# Patient Record
Sex: MALE | Race: WHITE | URBAN - METROPOLITAN AREA
[De-identification: names, ages, dates, MRNs, and addresses within clinical notes are randomized per-mention and may not be internally consistent; named-entity substitution may affect disease eponyms.]

---

## 2017-02-11 ENCOUNTER — HOSPITAL ENCOUNTER (EMERGENCY)
Facility: CLINIC | Age: 32
Discharge: HOME | End: 2017-02-11
Attending: FAMILY MEDICINE | Admitting: FAMILY MEDICINE
Payer: COMMERCIAL

## 2017-02-11 VITALS
RESPIRATION RATE: 16 BRPM | OXYGEN SATURATION: 99 % | DIASTOLIC BLOOD PRESSURE: 78 MMHG | TEMPERATURE: 98 F | SYSTOLIC BLOOD PRESSURE: 113 MMHG | BODY MASS INDEX: 31.83 KG/M2 | WEIGHT: 210 LBS | HEIGHT: 68 IN

## 2017-02-11 DIAGNOSIS — B30.9 VIRAL CONJUNCTIVITIS OF RIGHT EYE: ICD-10-CM

## 2017-02-11 DIAGNOSIS — B97.89 VIRAL RESPIRATORY INFECTION: ICD-10-CM

## 2017-02-11 DIAGNOSIS — J98.8 VIRAL RESPIRATORY INFECTION: ICD-10-CM

## 2017-02-11 PROCEDURE — 99283 EMERGENCY DEPT VISIT LOW MDM: CPT | Performed by: FAMILY MEDICINE

## 2017-02-11 PROCEDURE — 99283 EMERGENCY DEPT VISIT LOW MDM: CPT

## 2017-02-11 ASSESSMENT — VISUAL ACUITY
OS: 20/20
OD: 20/20

## 2017-02-11 NOTE — ED AVS SNAPSHOT
[facility] Emergency Department    [address] [facility] [address]    Phone:  [phone]    Fax:  [phone]                                       Pancho Ray   MRN: [id number]    Department:  [facility] Emergency Department   Date of Visit:  2/11/2017           After Visit Summary Signature Page     I have received my discharge instructions, and my questions have been answered. I have discussed any challenges I see with this plan with the nurse or doctor.    ..........................................................................................................................................  Patient/Patient Representative Signature      ..........................................................................................................................................  Patient Representative Print Name and Relationship to Patient    ..................................................               ................................................  Date                                            Time    ..........................................................................................................................................  Reviewed by Signature/Title    ...................................................              ..............................................  Date                                                            Time

## 2017-02-11 NOTE — DISCHARGE INSTRUCTIONS
Conjunctivitis, Viral    Viral conjunctivitis (sometimes called pink eye) is a common infection of the eye. It is very contagious. Touching the infected eye, then touching another person passes this infection. It can also be spread from one eye to the other in this same way. The most common symptoms include redness, discharge from the eye, swollen eyelids, and a gritty or scratchy feeling in the eye.  This condition will take about 7 to 10 days to go away. Artificial tears (available without a prescription) are often recommended to moisten and clean the eyes. Antibiotic eye drops often are not recommended because they will not kill the virus. But sometimes they may be prescribed by eye doctors. This is to prevent a second, bacterial infection.  Home care    Apply a towel soaked in cool water to the affected eye 3 to 4 times a day (just before applying medicine to the eye).    It is common to have mucus drainage during the night, causing the eyelids to become crusted by morning. Use a warm, wet cloth to wipe this away.    Launder cloths used to clean the eye after one use. Do not reuse them.    If antibiotic medicines are prescribed, take them exactly as directed. Do not stop taking them until you are told to.    You may use acetaminophen or ibuprofen to control pain, unless another medicine was prescribed. (Note:If you have chronic liver or kidney disease, or if you have ever had a stomach ulcer or gastrointestinal bleeding, talk with your healthcare provider before using these medicines.) Aspirin should never be used in anyone under 18 years of age who is ill with a fever. It may cause severe liver damage.    Wash your hands before and after touching the affected eye. This helps to prevent spreading the infection to your other eye and to others.    The infected person should avoid sharing towels, washcloths, and bedding with others. This is to prevent spreading the infection.    This illness is contagious during  the first week. Children with this illness should be kept out of day care and school until the redness clears.  Follow-up care  Follow up with your healthcare provider, or as advised.  When to seek medical advice  Call your healthcare provider right away if any of these occur:    Worsening vision    Increasing pain in the eye    Increasing swelling or redness of the eyelid    Redness spreading to the face around the eye    Large amount of green or yellow drainage from the eye    Severe itching in or around the eye    Fever of 100.4 F (38 C) or higher    5168-4899 [employer]. [address] [place] [address]. All rights reserved. This information is not intended as a substitute for professional medical care. Always follow your healthcare professional's instructions.

## 2017-02-11 NOTE — ED AVS SNAPSHOT
[facility] Emergency Department    [address] [facility] [address]    Phone:  [phone]    Fax:  [phone]                                       Pancho Ray   MRN: [id number]    Department:  [facility] Emergency Department   Date of Visit:  2/11/2017           Patient Information     Date Of Birth          1985        Your diagnoses for this visit were:     Viral respiratory infection     Viral conjunctivitis of right eye        You were seen by Brian Doyle MD.        Discharge Instructions         Conjunctivitis, Viral    Viral conjunctivitis (sometimes called pink eye) is a common infection of the eye. It is very contagious. Touching the infected eye, then touching another person passes this infection. It can also be spread from one eye to the other in this same way. The most common symptoms include redness, discharge from the eye, swollen eyelids, and a gritty or scratchy feeling in the eye.  This condition will take about 7 to 10 days to go away. Artificial tears (available without a prescription) are often recommended to moisten and clean the eyes. Antibiotic eye drops often are not recommended because they will not kill the virus. But sometimes they may be prescribed by eye doctors. This is to prevent a second, bacterial infection.  Home care    Apply a towel soaked in cool water to the affected eye 3 to 4 times a day (just before applying medicine to the eye).    It is common to have mucus drainage during the night, causing the eyelids to become crusted by morning. Use a warm, wet cloth to wipe this away.    Launder cloths used to clean the eye after one use. Do not reuse them.    If antibiotic medicines are prescribed, take them exactly as directed. Do not stop taking them until you are told to.    You may use acetaminophen or ibuprofen to control pain, unless another medicine was prescribed. (Note:If you have chronic liver or kidney disease, or if you have ever had a  stomach ulcer or gastrointestinal bleeding, talk with your healthcare provider before using these medicines.) Aspirin should never be used in anyone under 18 years of age who is ill with a fever. It may cause severe liver damage.    Wash your hands before and after touching the affected eye. This helps to prevent spreading the infection to your other eye and to others.    The infected person should avoid sharing towels, washcloths, and bedding with others. This is to prevent spreading the infection.    This illness is contagious during the first week. Children with this illness should be kept out of day care and school until the redness clears.  Follow-up care  Follow up with your healthcare provider, or as advised.  When to seek medical advice  Call your healthcare provider right away if any of these occur:    Worsening vision    Increasing pain in the eye    Increasing swelling or redness of the eyelid    Redness spreading to the face around the eye    Large amount of green or yellow drainage from the eye    Severe itching in or around the eye    Fever of 100.4 F (38 C) or higher    8861-1143 [employer]. [address] [place] [address]. All rights reserved. This information is not intended as a substitute for professional medical care. Always follow your healthcare professional's instructions.           Discharge References/Attachments     URI, VIRAL, NO ABX (ADULT) (ENGLISH)      24 Hour Appointment Hotline       To make an appointment at any [facility], call [phone] ([phone]). If you don't have a family doctor or clinic, we will help you find one. [facility] clinics are conveniently located to serve the needs of you and your family.             Review of your medicines      Notice     You have not been prescribed any medications.            Orders Needing Specimen Collection     None      Pending Results     No orders found from 2/10/2017 to 2/12/2017.            Pending  "Culture Results     No orders found from 2/10/2017 to 2017.             Test Results from your hospital stay            Thank you for choosing [facility]       Thank you for choosing [facility] for your care. Our goal is always to provide you with excellent care. Hearing back from our patients is one way we can continue to improve our services. Please take a few minutes to complete the written survey that you may receive in the mail after you visit with us. Thank you!        [email or web address] Information     [employer] lets you send messages to your doctor, view your test results, renew your prescriptions, schedule appointments and more. To sign up, go to [email or web address] . Click on \"Log in\" on the left side of the screen, which will take you to the Welcome page. Then click on \"Sign up Now\" on the right side of the page.     You will be asked to enter the access code listed below, as well as some personal information. Please follow the directions to create your username and password.     Your access code is: [email or web address]  Expires: 2017  8:10 AM     Your access code will  in 90 days. If you need help or a new code, please call your [facility] clinic or [phone].        Care EveryWhere ID     This is your Care EveryWhere ID. This could be used by other organizations to access your [facility] medical records  [id number]        After Visit Summary       This is your record. Keep this with you and show to your community pharmacist(s) and doctor(s) at your next visit.                  "

## 2017-02-11 NOTE — ED PROVIDER NOTES
"  History     Chief Complaint   Patient presents with     Conjunctivitis     pink sclera yesterday, today woke with mattery eye, URI sx     HPI    Mr. Pancho Ray is a 31 year old male who presents to the ED today for evaluation of conjunctivitis. The patient reports right pink conjunctiva beginning yesterday worsening today with crusting and drainage around his eye this morning. Additionally, the patient endorses associated rhinorhea and congestion since the start of his right eye symptoms.  He has a mild cough The patient believes his symptoms are representative of \"pink eye\".  He denies shortness of breath, or fever. He denies history of eye disease, allergies to medications, or daily medications.     I have reviewed the Medications, Allergies, Past Medical and Surgical History, and Social History in the [id number] system.  Past Medical History   No past medical history on file.    Problem List  There is no problem list on file for this patient.      Past Surgical History  No past surgical history on file.    Social History  Social History     Social History     Marital Status:      Spouse Name: N/A     Number of Children: N/A     Years of Education: N/A     Occupational History     Not on file.     Social History Main Topics     Smoking status: Not on file     Smokeless tobacco: Not on file     Alcohol Use: Not on file     Drug Use: Not on file     Sexual Activity: Not on file     Other Topics Concern     Not on file     Social History Narrative     No narrative on file       Review of Systems  Further problem focused review negative.  Physical Exam   BP: 121/82 mmHg  Heart Rate: 73  Temp: 98  F (36.7  C)  Resp: 16  Height: 172.7 cm (5' 8\")  Weight: 95.255 kg (210 lb)  SpO2: 99 %  Physical Exam  Nursing note and vitals were reviewed.  Constitutional: Healthy-appearing 31-year-old with hyponasal voice does not appear significantly ill or uncomfortable  HEENT: EACs clear.  TMs normal.  Slit-lamp examination " reveals conjunctival injection in the right eye without ciliary blush.  Lids and lashes are intact.  Cornea is intact.  Anterior chamber is clear.  Cardiovascular: Cardiac examination reveals normal heart rate and regular rhythm without murmur.  Pulmonary/Chest: Breathing is unlabored.  Breath sounds are clear and equal bilaterally.  There no retractions, tachypnea, rales, wheezes, rhonchi.      ED Course   Procedures             Critical Care time:  none               Labs Ordered and Resulted from Time of ED Arrival Up to the Time of Departure from the ED - No data to display  No results found for this or any previous visit (from the past 24 hour(s)).    Medications - No data to display    0804 AM Patient Assessed. Course of care outlined  Assessments & Plan (with Medical Decision Making)     31-year-old presents with congestion, rhinorrhea, conjunctivitis, mild cough.  Symptoms consistent with viral respiratory infection including viral conjunctivitis.  The nature of this was reviewed with him and signs and symptoms that should prompt follow-up were also discussed.    I have reviewed the nursing notes.    I have reviewed the findings, diagnosis, plan and need for follow up with the patient.    There are no discharge medications for this patient.      Final diagnoses:   Viral respiratory infection   Viral conjunctivitis of right eye     This document serves as a record of the services and decisions personally performed and made by Brian Doyle MD. It was created on HIS/HER behalf by Scott Osei, a trained medical scribe. The creation of this document is based the provider's statements to the medical scribe.  Scott Osei 8:03 AM 2/11/2017    Provider:   The information in this document, created by the medical scribe for me, accurately reflects the services I personally performed and the decisions made by me. I have reviewed and approved this document for accuracy prior to leaving the patient care  area.  Brian Doyle MD 8:03 AM 2/11/2017 2/11/2017   [facility] EMERGENCY DEPARTMENT      Brian Doyle MD  02/11/17 0854

## 2017-07-28 ENCOUNTER — HOSPITAL ENCOUNTER (EMERGENCY)
Facility: CLINIC | Age: 32
Discharge: HOME | End: 2017-07-28
Attending: NURSE PRACTITIONER | Admitting: NURSE PRACTITIONER

## 2017-07-28 VITALS
RESPIRATION RATE: 18 BRPM | TEMPERATURE: 98.2 F | OXYGEN SATURATION: 97 % | HEIGHT: 68 IN | SYSTOLIC BLOOD PRESSURE: 125 MMHG | BODY MASS INDEX: 32.58 KG/M2 | WEIGHT: 215 LBS | DIASTOLIC BLOOD PRESSURE: 83 MMHG | HEART RATE: 82 BPM

## 2017-07-28 DIAGNOSIS — L02.439 CARBUNCLE OF AXILLA: ICD-10-CM

## 2017-07-28 DIAGNOSIS — L03.111 CELLULITIS OF RIGHT AXILLA: ICD-10-CM

## 2017-07-28 PROCEDURE — 87186 SC STD MICRODIL/AGAR DIL: CPT | Performed by: NURSE PRACTITIONER

## 2017-07-28 PROCEDURE — 87070 CULTURE OTHR SPECIMN AEROBIC: CPT | Performed by: NURSE PRACTITIONER

## 2017-07-28 PROCEDURE — 87077 CULTURE AEROBIC IDENTIFY: CPT | Performed by: NURSE PRACTITIONER

## 2017-07-28 PROCEDURE — 10060 I&D ABSCESS SIMPLE/SINGLE: CPT

## 2017-07-28 PROCEDURE — 99213 OFFICE O/P EST LOW 20 MIN: CPT | Mod: 25

## 2017-07-28 PROCEDURE — 99213 OFFICE O/P EST LOW 20 MIN: CPT | Mod: 25 | Performed by: NURSE PRACTITIONER

## 2017-07-28 PROCEDURE — 10060 I&D ABSCESS SIMPLE/SINGLE: CPT | Performed by: NURSE PRACTITIONER

## 2017-07-28 RX ORDER — CEPHALEXIN 500 MG/1
500 CAPSULE ORAL 4 TIMES DAILY
Qty: 40 CAPSULE | Refills: 0 | Status: SHIPPED | OUTPATIENT
Start: 2017-07-28 | End: 2017-08-07

## 2017-07-28 NOTE — DISCHARGE INSTRUCTIONS
Discharge Instructions for Cellulitis  You have been diagnosed with cellulitis. This is an infection in the deepest layer of the skin. In some cases, the infection also affects the muscle. Cellulitis is caused by bacteria. The bacteria can enter the body through broken skin. This can happen with a cut, scratch, animal bite, or an insect bite that has been scratched. You may have been treated in the hospital with antibiotics and fluids. You will likely be given a prescription for antibiotics to take at home. This sheet will help you take care of yourself at home.  Home care  When you are home:    Take the prescribed antibiotic medicine you are given as directed until it is gone. Take it even if you feel better. It treats the infection and stops it from returning. Not taking all the medicine can make future infections hard to treat.    Keep the infected area clean.    When possible, raise the infected area above the level of your heart. This helps keep swelling down.    Talk with your healthcare provider if you are in pain. Ask what kind of over-the-counter medicine you can take for pain.    Apply clean bandages as advised.    Take your temperature once a day for a week.    Wash your hands often to prevent spreading the infection.  In the future, wash your hands before and after you touch cuts, scratches, or bandages. This will help prevent infection.   When to call your healthcare provider  Call your healthcare provider immediately if you have any of the following:    Difficulty or pain when moving the joints above or below the infected area    Discharge or pus draining from the area    Fever of 100.4 F (38 C) or higher, or as directed by your healthcare provider    Pain that gets worse in or around the infected     Redness that gets worse in or around the infected area, particularly if the area of redness expands to a wider area    Shaking chills    Swelling of the infected area    Vomiting   Date Last Reviewed:  8/1/2016 2000-2017 [employer]. [address] [place] [address], [place] [address]. All rights reserved. This information is not intended as a substitute for professional medical care. Always follow your healthcare professional's instructions.

## 2017-07-28 NOTE — ED AVS SNAPSHOT
[facility] Emergency Department    [address] [facility] [address]    Phone:  [phone]    Fax:  [phone]                                       Pancho Ray   MRN: [id number]    Department:  [facility] Emergency Department   Date of Visit:  7/28/2017           Patient Information     Date Of Birth          1985        Your diagnoses for this visit were:     Carbuncle of axilla     Cellulitis of right axilla        You were seen by Zita Davis APRN CNP.      Follow-up Information     Follow up with [facility] Emergency Department.    Specialty:  EMERGENCY MEDICINE    Why:  If symptoms worsen    Contact information:    [address] [facility] [address] [phone]    Additional information:    The medical center is located at   [address] (between [address] in [place], four miles north   of [place]).        Discharge Instructions         Discharge Instructions for Cellulitis  You have been diagnosed with cellulitis. This is an infection in the deepest layer of the skin. In some cases, the infection also affects the muscle. Cellulitis is caused by bacteria. The bacteria can enter the body through broken skin. This can happen with a cut, scratch, animal bite, or an insect bite that has been scratched. You may have been treated in the hospital with antibiotics and fluids. You will likely be given a prescription for antibiotics to take at home. This sheet will help you take care of yourself at home.  Home care  When you are home:    Take the prescribed antibiotic medicine you are given as directed until it is gone. Take it even if you feel better. It treats the infection and stops it from returning. Not taking all the medicine can make future infections hard to treat.    Keep the infected area clean.    When possible, raise the infected area above the level of your heart. This helps keep swelling down.    Talk with your  healthcare provider if you are in pain. Ask what kind of over-the-counter medicine you can take for pain.    Apply clean bandages as advised.    Take your temperature once a day for a week.    Wash your hands often to prevent spreading the infection.  In the future, wash your hands before and after you touch cuts, scratches, or bandages. This will help prevent infection.   When to call your healthcare provider  Call your healthcare provider immediately if you have any of the following:    Difficulty or pain when moving the joints above or below the infected area    Discharge or pus draining from the area    Fever of 100.4 F (38 C) or higher, or as directed by your healthcare provider    Pain that gets worse in or around the infected     Redness that gets worse in or around the infected area, particularly if the area of redness expands to a wider area    Shaking chills    Swelling of the infected area    Vomiting   Date Last Reviewed: 8/1/2016 2000-2017 [employer]. [address] [place] [address]. All rights reserved. This information is not intended as a substitute for professional medical care. Always follow your healthcare professional's instructions.          24 Hour Appointment Hotline       To make an appointment at any [facility]], call [phone] ([phone]). If you don't have a family doctor or clinic, we will help you find one. [facility] clinics are conveniently located to serve the needs of you and your family.             Review of your medicines      START taking        Dose / Directions Last dose taken    cephALEXin 500 MG capsule   Commonly known as:  KEFLEX   Dose:  500 mg   Quantity:  40 capsule        Take 1 capsule (500 mg) by mouth 4 times daily for 10 days   Refills:  0                Prescriptions were sent or printed at these locations (1 Prescription)                   [id number] - [place], [place] - [address]   [address] [facility]  "[place] [address]    Telephone:  [phone]   Fax:  [phone]   Hours:                  E-Prescribed (1 of 1)         cephALEXin (KEFLEX) 500 MG capsule                Procedures and tests performed during your visit     Wound Culture Aerobic Bacterial      Orders Needing Specimen Collection     None      Pending Results     No orders found from 2017 to 2017.            Pending Culture Results     No orders found from 2017 to 2017.            Pending Results Instructions     If you had any lab results that were not finalized at the time of your Discharge, you can call the ED Lab Result RN at [phone]. You will be contacted by this team for any positive Lab results or changes in treatment. The nurses are available 7 days a week from 10A to 6:30P.  You can leave a message 24 hours per day and they will return your call.        Test Results From Your Hospital Stay               Thank you for choosing [facility]       Thank you for choosing [facility] for your care. Our goal is always to provide you with excellent care. Hearing back from our patients is one way we can continue to improve our services. Please take a few minutes to complete the written survey that you may receive in the mail after you visit with us. Thank you!        [employer] Information     [employer] lets you send messages to your doctor, view your test results, renew your prescriptions, schedule appointments and more. To sign up, go to [email or web address] . Click on \"Log in\" on the left side of the screen, which will take you to the Welcome page. Then click on \"Sign up Now\" on the right side of the page.     You will be asked to enter the access code listed below, as well as some personal information. Please follow the directions to create your username and password.     Your access code is: [email or web address]  Expires: 10/26/2017  6:51 PM     Your access code will  in 90 days. If you need help or a new code, please call your " [facility] or [phone].        Care EveryWhere ID     This is your Care EveryWhere ID. This could be used by other organizations to access your [facility] medical records  [id number]        Equal Access to Services     CASI HOPSON : Bhargav Maurer, waaxda luqadaha, qaybta kaalmada zac, javon gill. So [facility] [phone].    ATENCIÓN: Si habla español, tiene a griffin disposición servicios gratuitos de asistencia lingüística. Llame al [phone].    We comply with applicable federal civil rights laws and [place] laws. We do not discriminate on the basis of race, color, national origin, age, disability sex, sexual orientation or gender identity.            After Visit Summary       This is your record. Keep this with you and show to your community pharmacist(s) and doctor(s) at your next visit.

## 2017-07-28 NOTE — ED AVS SNAPSHOT
[facility] Emergency Department    [address] [address]    Phone:  [phone]    Fax:  [phone]                                       Pancho Ray   MRN: [id number]    Department:  [facility] Emergency Department   Date of Visit:  7/28/2017           After Visit Summary Signature Page     I have received my discharge instructions, and my questions have been answered. I have discussed any challenges I see with this plan with the nurse or doctor.    ..........................................................................................................................................  Patient/Patient Representative Signature      ..........................................................................................................................................  Patient Representative Print Name and Relationship to Patient    ..................................................               ................................................  Date                                            Time    ..........................................................................................................................................  Reviewed by Signature/Title    ...................................................              ..............................................  Date                                                            Time

## 2017-07-28 NOTE — ED NOTES
Patient here for possible infection under the right arm, symptoms started 2 days ago.  Patient presents ambulatory to the urgent care.

## 2017-07-29 NOTE — ED PROVIDER NOTES
"  History     Chief Complaint   Patient presents with     Cellulitis     pt had what appears to be  an ingrown hair in R axilla.  now is redened.  otherwise feels well and is afebrile.      HPI  Pancho Ray is a 31 year old male who presents to urgent care for evaluation of abscess to right axilla.  Symptoms present for 2 days.  Increase swelling, pain, and redness over the last 24 hours.  Denies fever or chills.  No history of MRSA or abscesses.    I have reviewed the Medications, Allergies, Past Medical and Surgical History, and Social History in the Epic system.    Allergies: No Known Allergies      No current facility-administered medications on file prior to encounter.   No current outpatient prescriptions on file prior to encounter.    There is no problem list on file for this patient.      History reviewed. No pertinent surgical history.    Social History   Substance Use Topics     Smoking status: Never Smoker     Smokeless tobacco: Never Used     Alcohol use Not on file         There is no immunization history on file for this patient.    BMI: Estimated body mass index is 32.69 kg/(m^2) as calculated from the following:    Height as of this encounter: 1.727 m (5' 8\").    Weight as of this encounter: 97.5 kg (215 lb).      Review of Systems  As mentioned above in the history present illness. All other systems were reviewed and are negative.    Physical Exam   BP: 125/83  Pulse: 82  Temp: 98.2  F (36.8  C)  Resp: 18  Height: 172.7 cm (5' 8\")  Weight: 97.5 kg (215 lb)  SpO2: 97 %  Physical Exam    GENERAL APPEARANCE: healthy, alert and no distress  NECK: supple, nontender, no lymphadenopathy  RESP: lungs clear to auscultation - no rales, rhonchi or wheezes  CV: regular rates and rhythm, normal S1 S2, no murmur noted  SKIN: RIGHT AXILLA-  4cm (carbuncle) abscess anterior region and another 1cm (carbuncle) abscess posterior region.  There is extending erythema >5cm from the anterior abscess.     ED Course "     ED Course     Incision + drainage  Date/Time: 7/28/2017 6:32 PM  Performed by: MIGUEL DAVIS  Authorized by: MIGUEL DAVIS   Consent: Verbal consent obtained.  Risks and benefits: risks, benefits and alternatives were discussed  Consent given by: patient  Type: abscess  Body area: upper extremity (2 abscesses in the right axilla)  Anesthesia: local infiltration    Anesthesia:  Local Anesthetic: lidocaine 1% without epinephrine  Anesthetic total: 3 mL  Scalpel size: 11  Incision type: single straight  Complexity: simple  Drainage: purulent  Drainage amount: moderate  Wound treatment: wound left open  Patient tolerance: Patient tolerated the procedure well with no immediate complications                 Labs Ordered and Resulted from Time of ED Arrival Up to the Time of Departure from the ED - No data to display    Assessments & Plan (with Medical Decision Making)   Patient presents with 2 carbuncles (abscesses) in right axilla with extending erythema--cellulitis.  Incision and drainage performed to both abscesses as documented above.  Patient tolerated procedure. Wound culture pending. Gauze dressing applied and patient given some supplies.  Prescription Keflex and to the pharmacy.  Patient was instructed to return for fever, increased redness, swelling, or pain.  He was instructed to perform warm soapy water irrigation in the shower 3-4 times a day for application of warm moist compresses to allow for drainage.  I have reviewed the nursing notes.    I have reviewed the findings, diagnosis, plan and need for follow up with the patient.      New Prescriptions    CEPHALEXIN (KEFLEX) 500 MG CAPSULE    Take 1 capsule (500 mg) by mouth 4 times daily for 10 days       Final diagnoses:   Carbuncle of axilla   Cellulitis of right axilla       7/28/2017   [facility] EMERGENCY DEPARTMENT     Miguel Davis, KADEN MAGAÑA  07/28/17 [id number]

## 2017-07-31 ENCOUNTER — TELEPHONE (OUTPATIENT)
Dept: EMERGENCY MEDICINE | Facility: CLINIC | Age: 32
End: 2017-07-31

## 2017-07-31 LAB
BACTERIA SPEC CULT: ABNORMAL
Lab: ABNORMAL
MICRO REPORT STATUS: ABNORMAL
MICROORGANISM SPEC CULT: ABNORMAL
SPECIMEN SOURCE: ABNORMAL

## 2017-07-31 NOTE — TELEPHONE ENCOUNTER
[facility] Emergency Department Lab result notification:    [facility] ED lab result protocol used  General Culture Protocol - Wound    Reason for call  Notify of lab results, assess symptoms,  review ED providers recommendations/discharge instructions (if necessary) and advise per ED lab result f/u protocol    Lab Result  Final Wound culture report on 07/31/2017  [facility] ED discharge antibiotic: Cephalexin (Keflex) 500 mg capsule, Take 1 capsule (500 mg) by mouth 4 times daily for 10 days  #1. Bacteria, Moderate growth Staphylococcus aureus, which is SUSCEPTIBLE to ED discharge antibiotic - Keflex  Incision and Drainage performed in [facility] ED [Yes / No]: Yes  Patient to be notified of result, symptoms's assessed and advised per [facility] ED lab result protocol.  Information table from ED Provider visit on 11/28/2017  Symptoms reported at ED visit (Chief complaint, HPI)  a 31 year old male who presents to urgent care for evaluation of abscess to right axilla.  Symptoms present for 2 days.  Increase swelling, pain, and redness over the last 24 hours.  Denies fever or chills.  No history of MRSA or abscesses.   ED providers Impression and Plan (applicable information)  2 carbuncles (abscesses) in right axilla with extending erythema--cellulitis.  Incision and drainage performed to both abscesses as documented above.  Patient tolerated procedure. Wound culture pending. Gauze dressing applied and patient given some supplies.  Prescription Keflex and to the pharmacy.  Patient was instructed to return for fever, increased redness, swelling, or pain.  He was instructed to perform warm soapy water irrigation in the shower 3-4 times a day for application of warm moist compresses to allow for drainage.  I have reviewed the nursing notes.     RN Assessment (Patient s current Symptoms), include time called.  [Insert Left message here if message left]  At 1629 Left voicemail message requesting a call back to [phone]  between 10 a.m. and 6:00 p.m., 7 days a week for patient's ED/UC lab results.  May leave a message 24/7, if no one available.     Ear Kurtz, RN  [facility] RN  Lung Nodule and ED Lab Result F/u RN  Epic pool (ED late result f/u RN): [id number]  [facility] INCIDENTAL RADIOLOGY F/U NURSES: [id number]  # [phone]      Copy of Lab result   Exam Information   Exam Date Exam Time Accession # Results    7/28/17  6:44 PM [id number]    Component Results   Component Collected Lab   Specimen Description 07/28/2017  6:44 PM 59   Right Axilla   Special Requests 07/28/2017  6:44 PM 75   Specimen collected in eSwab transport (white cap)   Culture Micro (Abnormal) 07/28/2017  6:44    Moderate growth Staphylococcus aureus   Micro Report Status 07/28/2017  6:44    FINAL 07/31/2017   Organism: 07/28/2017  6:44    Moderate growth Staphylococcus aureus   Culture & Susceptibility   MODERATE GROWTH STAPHYLOCOCCUS AUREUS (CORA)   Antibiotic Sensitivity Unit Status   CIPROFLOXACIN 1 Susceptible ug/mL Final   CLINDAMYCIN <=0.25 Susceptible ug/mL Final   ERYTHROMYCIN <=0.25 Susceptible ug/mL Final   GENTAMICIN <=0.5 Susceptible ug/mL Final   LEVOFLOXACIN 0.5 Susceptible ug/mL Final   OXACILLIN <=0.25 Susceptible ug/mL Final   PENICILLIN Resistant ug/mL Final   TETRACYCLINE <=1 Susceptible ug/mL Final   Trimethoprim/Sulfa <=.5/9.5 Susceptible ug/mL Final   VANCOMYCIN 1 Susceptible ug/mL Final

## 2017-07-31 NOTE — TELEPHONE ENCOUNTER
Pt notes it is doing okay still draining, he is following dc instructions about showering irrigation, taking antibiotics as prescribed.      Follow up discussed and S&S to monitor for an follow up right away if they occur    Era Kurtz RN  [facility] RN  Lung Nodule and ED Lab Result F/u RN  Epic pool (ED late result f/u RN): [id number]  FV INCIDENTAL RADIOLOGY F/U NURSES: P [id number]  Ph# [phone]

## 2017-08-06 ENCOUNTER — APPOINTMENT (OUTPATIENT)
Dept: GENERAL RADIOLOGY | Facility: CLINIC | Age: 32
End: 2017-08-06
Attending: EMERGENCY MEDICINE
Payer: COMMERCIAL

## 2017-08-06 ENCOUNTER — HOSPITAL ENCOUNTER (EMERGENCY)
Facility: CLINIC | Age: 32
Discharge: HOME | End: 2017-08-06
Attending: EMERGENCY MEDICINE | Admitting: EMERGENCY MEDICINE
Payer: COMMERCIAL

## 2017-08-06 VITALS
RESPIRATION RATE: 16 BRPM | TEMPERATURE: 97.6 F | OXYGEN SATURATION: 95 % | DIASTOLIC BLOOD PRESSURE: 80 MMHG | SYSTOLIC BLOOD PRESSURE: 113 MMHG | HEIGHT: 68 IN

## 2017-08-06 DIAGNOSIS — S80.11XA TRAUMATIC ECCHYMOSIS OF MULTIPLE SITES OF RIGHT LOWER EXTREMITY, INITIAL ENCOUNTER: ICD-10-CM

## 2017-08-06 LAB
ALBUMIN SERPL-MCNC: 3.4 G/DL (ref 3.4–5)
ALP SERPL-CCNC: 90 U/L (ref 40–150)
ALT SERPL W P-5'-P-CCNC: 44 U/L (ref 0–70)
ANION GAP SERPL CALCULATED.3IONS-SCNC: 5 MMOL/L (ref 3–14)
AST SERPL W P-5'-P-CCNC: 25 U/L (ref 0–45)
BASOPHILS # BLD AUTO: 0.1 10E9/L (ref 0–0.2)
BASOPHILS NFR BLD AUTO: 0.9 %
BILIRUB SERPL-MCNC: 0.3 MG/DL (ref 0.2–1.3)
BUN SERPL-MCNC: 14 MG/DL (ref 7–30)
CALCIUM SERPL-MCNC: 8.4 MG/DL (ref 8.5–10.1)
CHLORIDE SERPL-SCNC: 107 MMOL/L (ref 94–109)
CO2 SERPL-SCNC: 28 MMOL/L (ref 20–32)
CREAT SERPL-MCNC: 0.92 MG/DL (ref 0.66–1.25)
DIFFERENTIAL METHOD BLD: NORMAL
EOSINOPHIL # BLD AUTO: 0.4 10E9/L (ref 0–0.7)
EOSINOPHIL NFR BLD AUTO: 6.4 %
ERYTHROCYTE [DISTWIDTH] IN BLOOD BY AUTOMATED COUNT: 11.9 % (ref 10–15)
GFR SERPL CREATININE-BSD FRML MDRD: ABNORMAL ML/MIN/1.7M2
GLUCOSE SERPL-MCNC: 96 MG/DL (ref 70–99)
HCT VFR BLD AUTO: 41.5 % (ref 40–53)
HGB BLD-MCNC: 14.2 G/DL (ref 13.3–17.7)
IMM GRANULOCYTES # BLD: 0 10E9/L (ref 0–0.4)
IMM GRANULOCYTES NFR BLD: 0.3 %
INR PPP: 0.9 (ref 0.86–1.14)
LYMPHOCYTES # BLD AUTO: 2.2 10E9/L (ref 0.8–5.3)
LYMPHOCYTES NFR BLD AUTO: 34.5 %
MCH RBC QN AUTO: 31.8 PG (ref 26.5–33)
MCHC RBC AUTO-ENTMCNC: 34.2 G/DL (ref 31.5–36.5)
MCV RBC AUTO: 93 FL (ref 78–100)
MONOCYTES # BLD AUTO: 0.5 10E9/L (ref 0–1.3)
MONOCYTES NFR BLD AUTO: 7.3 %
NEUTROPHILS # BLD AUTO: 3.3 10E9/L (ref 1.6–8.3)
NEUTROPHILS NFR BLD AUTO: 50.6 %
PLATELET # BLD AUTO: 296 10E9/L (ref 150–450)
POTASSIUM SERPL-SCNC: 3.9 MMOL/L (ref 3.4–5.3)
PROT SERPL-MCNC: 6.6 G/DL (ref 6.8–8.8)
RBC # BLD AUTO: 4.46 10E12/L (ref 4.4–5.9)
SODIUM SERPL-SCNC: 140 MMOL/L (ref 133–144)
WBC # BLD AUTO: 6.4 10E9/L (ref 4–11)

## 2017-08-06 PROCEDURE — 80053 COMPREHEN METABOLIC PANEL: CPT | Performed by: EMERGENCY MEDICINE

## 2017-08-06 PROCEDURE — 85025 COMPLETE CBC W/AUTO DIFF WBC: CPT | Performed by: EMERGENCY MEDICINE

## 2017-08-06 PROCEDURE — 99284 EMERGENCY DEPT VISIT MOD MDM: CPT | Performed by: EMERGENCY MEDICINE

## 2017-08-06 PROCEDURE — 73560 X-RAY EXAM OF KNEE 1 OR 2: CPT | Mod: RT

## 2017-08-06 PROCEDURE — 99284 EMERGENCY DEPT VISIT MOD MDM: CPT | Mod: 25

## 2017-08-06 PROCEDURE — 85610 PROTHROMBIN TIME: CPT | Performed by: EMERGENCY MEDICINE

## 2017-08-06 ASSESSMENT — ENCOUNTER SYMPTOMS
HEMATURIA: 0
DIAPHORESIS: 0
BLOOD IN STOOL: 0
COLOR CHANGE: 1
ARTHRALGIAS: 1

## 2017-08-06 NOTE — ED PROVIDER NOTES
"  History     Chief Complaint   Patient presents with     Bleeding/Bruising     pt has large bruise on right leg, had no known injury     HPI  Pancho Ray is a 31 year old male who presents to the ED for evaluation of right knee bruising and swelling. Patient reports that 6 days ago, he was at work and \"rolled his right knee\" while standing up. Patient had no trauma to the area, and he does not think that his patella was dislocated. He had some tenderness after he initially rolled his knee. After he got home from work, he had lots of swelling in the knee to the point of immobility. He reports that his pain was minimal at this time, and he had a small bruise on the knee. 5 days ago, he started to have increased bruising in the knee that has been consistently worsening. Today, patient reports that his bruising has grown from just the knee down the length of his lower leg in the last 5 days. He states that his pain is still very minimal. He is not on any blood thinners. He denies hemoptysis, hematuria, blood in the stool, any other bruising, and abnormal night sweats.     History reviewed. No pertinent past medical history.  There is no problem list on file for this patient.    No current facility-administered medications for this encounter.      Current Outpatient Prescriptions   Medication     cephALEXin (KEFLEX) 500 MG capsule      No Known Allergies  Social History     Social History     Marital status:      Spouse name: N/A     Number of children: N/A     Years of education: N/A     Occupational History     Not on file.     Social History Main Topics     Smoking status: Never Smoker     Smokeless tobacco: Never Used     Alcohol use Not on file     Drug use: Not on file     Sexual activity: Not on file     Other Topics Concern     Not on file     Social History Narrative     No family history on file.    I have reviewed the Medications, Allergies, Past Medical and Surgical History, and Social History in " "the Epic system.    Review of Systems   Constitutional: Negative for diaphoresis.   Gastrointestinal: Negative for blood in stool.   Genitourinary: Negative for hematuria.   Musculoskeletal: Positive for arthralgias (right knee).   Skin: Positive for color change (bruising on right knee and lower leg).     All other systems reviewed and are negative.    Physical Exam   BP: 113/80  Heart Rate: 85  Temp: 97.6  F (36.4  C)  Resp: 16  Height: 172.7 cm (5' 8\")  SpO2: 95 %  Physical Exam Gen. alert cooperative male in mild distress.  HEENT shows no scleral icterus.  No oral lesions or bruising.  Neck is supple.  Back reveals no bruising or skin lesions.  This is also true for her chest and abdomen.  On his left tricep he has a old bruises.  On his right leg medially he has a bruise from the knee to the lower calf.  This isn't very stages of resolution.  He does not have significant pain or fluctuance with palpation.  Mild discomfort in the inferior medial patella but the patella is freely mobile.  Question if there is subtle prepatellar fluid.  There is no skin cuts or abrasions.  Posteriorly there is no Baker's cyst or pulsatile lesion in the popliteal area.  The knee has full range of motion without limitation.  No laxity of the events.  Distally he has no ankle effusion or involvement.  The foot is unremarkable.  Intact pulses and sensation.  Do not appreciate significant adenopathy in neck, axilla and groin.  Negative Homans.    ED Course     ED Course     Procedures           Results for orders placed or performed during the hospital encounter of 08/06/17   Knee XR, 2 view, right    Narrative    KNEE ONE-TWO VIEWS RIGHT  8/6/2017 4:43 PM     HISTORY: Atraumatic knee pain and bruising.    COMPARISON: None.    FINDINGS: Prepatellar soft tissue swelling noted. Question a small  suprapatellar effusion. There is no acute fracture. No dislocation.   There are no worrisome bony lesions.      Impression    IMPRESSION:  No " acute osseous abnormality demonstrated.         Critical Care time:  none               Labs Ordered and Resulted from Time of ED Arrival Up to the Time of Departure from the ED   COMPREHENSIVE METABOLIC PANEL - Abnormal; Notable for the following:        Result Value    Calcium 8.4 (*)     Protein Total 6.6 (*)     All other components within normal limits   CBC WITH PLATELETS DIFFERENTIAL   INR     Results for orders placed or performed during the hospital encounter of 08/06/17 (from the past 24 hour(s))   CBC with platelets differential   Result Value Ref Range    WBC 6.4 4.0 - 11.0 10e9/L    RBC Count 4.46 4.4 - 5.9 10e12/L    Hemoglobin 14.2 13.3 - 17.7 g/dL    Hematocrit 41.5 40.0 - 53.0 %    MCV 93 78 - 100 fl    MCH 31.8 26.5 - 33.0 pg    MCHC 34.2 31.5 - 36.5 g/dL    RDW 11.9 10.0 - 15.0 %    Platelet Count 296 150 - 450 10e9/L    Diff Method Automated Method     % Neutrophils 50.6 %    % Lymphocytes 34.5 %    % Monocytes 7.3 %    % Eosinophils 6.4 %    % Basophils 0.9 %    % Immature Granulocytes 0.3 %    Absolute Neutrophil 3.3 1.6 - 8.3 10e9/L    Absolute Lymphocytes 2.2 0.8 - 5.3 10e9/L    Absolute Monocytes 0.5 0.0 - 1.3 10e9/L    Absolute Eosinophils 0.4 0.0 - 0.7 10e9/L    Absolute Basophils 0.1 0.0 - 0.2 10e9/L    Abs Immature Granulocytes 0.0 0 - 0.4 10e9/L   INR   Result Value Ref Range    INR 0.90 0.86 - 1.14   Comprehensive metabolic panel   Result Value Ref Range    Sodium 140 133 - 144 mmol/L    Potassium 3.9 3.4 - 5.3 mmol/L    Chloride 107 94 - 109 mmol/L    Carbon Dioxide 28 20 - 32 mmol/L    Anion Gap 5 3 - 14 mmol/L    Glucose 96 70 - 99 mg/dL    Urea Nitrogen 14 7 - 30 mg/dL    Creatinine 0.92 0.66 - 1.25 mg/dL    GFR Estimate >90  Non  GFR Calc   >60 mL/min/1.7m2    GFR Estimate If Black >90   GFR Calc   >60 mL/min/1.7m2    Calcium 8.4 (L) 8.5 - 10.1 mg/dL    Bilirubin Total 0.3 0.2 - 1.3 mg/dL    Albumin 3.4 3.4 - 5.0 g/dL    Protein Total 6.6 (L) 6.8 -  "8.8 g/dL    Alkaline Phosphatase 90 40 - 150 U/L    ALT 44 0 - 70 U/L    AST 25 0 - 45 U/L   Knee XR, 2 view, right    Narrative    KNEE ONE-TWO VIEWS RIGHT  8/6/2017 4:43 PM     HISTORY: Atraumatic knee pain and bruising.    COMPARISON: None.    FINDINGS: Prepatellar soft tissue swelling noted. Question a small  suprapatellar effusion. There is no acute fracture. No dislocation.   There are no worrisome bony lesions.      Impression    IMPRESSION:  No acute osseous abnormality demonstrated.    GUERLINE WILKINSON MD       Medications - No data to display    4:12 PM Patient assessed.   Blood work was obtained and an x-ray of the knee is ordered.  At 4:55 PM discussed results patient's visual blood work showing normal CBC, platelets and INR.  X-ray showed no acute fracture or worrisome bony lesions.  He did have some soft tissue swelling the prepatellar region.  Comprehensive metabolic panel is pending.  Assessments & Plan (with Medical Decision Making)   Patient is a 31-year-old male presents with persistent swelling and bruising of his right lower leg.  On July 31 he \"rolled his knee\" at work and had some mild discomfort.  He then noted some bruising on the medial aspect inferior to the knee.  That has progressed down his leg.  He has just mild discomfort today.  He is able to ambulate without assistance.  No other unusual bruising or bleeding.  Had any recent febrile illness.  He denies night sweats.  He has not had abdominal pain, nausea or vomiting.  No nosebleeds, hemoptysis, hematuria or dark/bloody stools.  He is not on antiplatelets or blood thinners.  No treatment for his pain or bruising prior to arrival.  Exam patient was afebrile vital signs are stable.  He has bruising on the medial lower leg from just below his patella down to his ankle.  This is in very stages of resolution.  There is no fluctuance noted.  He has no calf tenderness.  There is no ankle swelling or joint effusion.  Examination of his knee " reveals mild discomfort with palpation just inferior and medial to the patella.  The patella itself is mobile and nontender.  There is no crepitus of the knee joint.  There is no laxity of the ligamentous structures.  X-ray of the knee showed some prepatellar soft tissue swelling otherwise no acute abnormality was noted.  Patient's blood work showed normal CBC, platelets, and INR.  Comprehensive metabolic panel was normal.  Patient is given a handout on contusion/bruise.  Reasons to return for reassessment discussed.  I have reviewed the nursing notes.    I have reviewed the findings, diagnosis, plan and need for follow up with the patient.       New Prescriptions    No medications on file       Final diagnoses:   Traumatic ecchymosis of multiple sites of right lower extremity, initial encounter     This document serves as a record of the services and decisions personally performed and made by Skyler Danielle MD. It was created on HIS/HER behalf by   Thania Roldan, a trained medical scribe. The creation of this document is based the provider's statements to the medical scribe.  Thania Roldan 4:12 PM 8/6/2017    Provider:   The information in this document, created by the medical scribe for me, accurately reflects the services I personally performed and the decisions made by me. I have reviewed and approved this document for accuracy prior to leaving the patient care area.  Skyler Danielle MD 4:12 PM 8/6/2017 8/6/2017   [facility] EMERGENCY DEPARTMENT     Skyler Danielle MD  08/06/17 0998

## 2017-08-06 NOTE — ED AVS SNAPSHOT
[facility] Emergency Department    [address] [facility] [address]    Phone:  [phone]    Fax:  [phone]                                       Pancho Ray   MRN: [id number]    Department:  [facility] Emergency Department   Date of Visit:  8/6/2017           Patient Information     Date Of Birth          1985        Your diagnoses for this visit were:     Traumatic ecchymosis of multiple sites of right lower extremity, initial encounter        You were seen by Skyler Danielle MD.      Follow-up Information     Follow up with Primary Kerry Sheppard MD.    Why:  As needed        Discharge Instructions         Lower Extremity Contusion  You have a contusion (bruise) of a lower extremity (leg, knee, ankle, foot, or toe). Symptoms include pain, swelling, and skin discoloration. No bones are broken. This injury may take from a few days to a few weeks to heal.  During that time, the bruise may change from reddish in color, to purple-blue, to green-yellow, to yellow-brown.  Home care    Unless another medicine was prescribed, you can take acetaminophen, ibuprofen, or naproxen to control pain. (If you have chronic liver or kidney disease or ever had a stomach ulcer or gastrointestinal bleeding, talk with your doctor before using these medicines.)    Elevate the injured area to reduce pain and swelling. As much as possible, sit or lie down with the injured area raised about the level of your heart. This is especially important during the first 48 hours.    Ice the injured area to help reduce pain and swelling. Wrap a cold source (ice pack or ice cubes in a plastic bag) in a thin towel. Apply to the bruised area for 20 minutes every 1 to 2 hours the first day. Continue this 3 to 4 times a day until the pain and swelling goes away.    If crutches have been advised, do not bear full weight on the injured leg until you can do so without pain. You may return to sports when you are able to put full  weight and impact on the injured leg without pain.  Follow up  Follow up with your healthcare provider or our staff as advised. Call if you are not improving within the next 1 to 2 weeks.  When to seek medical advice   Call your healthcare provider right away if any of these occur:    Increased pain or swelling    Foot or toes become cold, blue, numb or tingly    Signs of infection: Warmth, drainage, or increased redness or pain around the injury    Inability to move the injured area     Frequent bruising for unknown reasons  Date Last Reviewed: 2/1/2017 2000-2017 [employer]. [address] [place] [id number]. All rights reserved. This information is not intended as a substitute for professional medical care. Always follow your healthcare professional's instructions.          24 Hour Appointment Hotline       To make an appointment at any [facility], call [phone] ([phone]). If you don't have a family doctor or clinic, we will help you find one. [facility] are conveniently located to serve the needs of you and your family.             Review of your medicines      Our records show that you are taking the medicines listed below. If these are incorrect, please call your family doctor or clinic.        Dose / Directions Last dose taken    cephALEXin 500 MG capsule   Commonly known as:  KEFLEX   Dose:  500 mg   Quantity:  40 capsule        Take 1 capsule (500 mg) by mouth 4 times daily for 10 days   Refills:  0                Procedures and tests performed during your visit     CBC with platelets differential    Comprehensive metabolic panel    INR    Knee XR, 2 view, right      Orders Needing Specimen Collection     None      Pending Results     No orders found from 8/4/2017 to 8/7/2017.            Pending Culture Results     No orders found from 8/4/2017 to 8/7/2017.            Pending Results Instructions     If you had any lab results that were not finalized at the  time of your Discharge, you can call the ED Lab Result RN at [phone]. You will be contacted by this team for any positive Lab results or changes in treatment. The nurses are available 7 days a week from 10A to 6:30P.  You can leave a message 24 hours per day and they will return your call.        Test Results From Your Hospital Stay        8/6/2017  4:45 PM      Component Results     Component Value Ref Range & Units Status    WBC 6.4 4.0 - 11.0 10e9/L Final    RBC Count 4.46 4.4 - 5.9 10e12/L Final    Hemoglobin 14.2 13.3 - 17.7 g/dL Final    Hematocrit 41.5 40.0 - 53.0 % Final    MCV 93 78 - 100 fl Final    MCH 31.8 26.5 - 33.0 pg Final    MCHC 34.2 31.5 - 36.5 g/dL Final    RDW 11.9 10.0 - 15.0 % Final    Platelet Count 296 150 - 450 10e9/L Final    Diff Method Automated Method  Final    % Neutrophils 50.6 % Final    % Lymphocytes 34.5 % Final    % Monocytes 7.3 % Final    % Eosinophils 6.4 % Final    % Basophils 0.9 % Final    % Immature Granulocytes 0.3 % Final    Absolute Neutrophil 3.3 1.6 - 8.3 10e9/L Final    Absolute Lymphocytes 2.2 0.8 - 5.3 10e9/L Final    Absolute Monocytes 0.5 0.0 - 1.3 10e9/L Final    Absolute Eosinophils 0.4 0.0 - 0.7 10e9/L Final    Absolute Basophils 0.1 0.0 - 0.2 10e9/L Final    Abs Immature Granulocytes 0.0 0 - 0.4 10e9/L Final         8/6/2017  4:47 PM      Component Results     Component Value Ref Range & Units Status    INR 0.90 0.86 - 1.14 Final         8/6/2017  5:05 PM      Component Results     Component Value Ref Range & Units Status    Sodium 140 133 - 144 mmol/L Final    Potassium 3.9 3.4 - 5.3 mmol/L Final    Chloride 107 94 - 109 mmol/L Final    Carbon Dioxide 28 20 - 32 mmol/L Final    Anion Gap 5 3 - 14 mmol/L Final    Glucose 96 70 - 99 mg/dL Final    Urea Nitrogen 14 7 - 30 mg/dL Final    Creatinine 0.92 0.66 - 1.25 mg/dL Final    GFR Estimate >90  Non  GFR Calc   >60 mL/min/1.7m2 Final    GFR Estimate If Black >90   GFR Calc    ">60 mL/min/1.7m2 Final    Calcium 8.4 (L) 8.5 - 10.1 mg/dL Final    Bilirubin Total 0.3 0.2 - 1.3 mg/dL Final    Albumin 3.4 3.4 - 5.0 g/dL Final    Protein Total 6.6 (L) 6.8 - 8.8 g/dL Final    Alkaline Phosphatase 90 40 - 150 U/L Final    ALT 44 0 - 70 U/L Final    AST 25 0 - 45 U/L Final         2017  5:02 PM      Narrative     KNEE ONE-TWO VIEWS RIGHT  2017 4:43 PM     HISTORY: Atraumatic knee pain and bruising.    COMPARISON: None.    FINDINGS: Prepatellar soft tissue swelling noted. Question a small  suprapatellar effusion. There is no acute fracture. No dislocation.   There are no worrisome bony lesions.        Impression     IMPRESSION:  No acute osseous abnormality demonstrated.    GUERLINE WILKINSON MD                Thank you for choosing [facility]       Thank you for choosing [facility] for your care. Our goal is always to provide you with excellent care. Hearing back from our patients is one way we can continue to improve our services. Please take a few minutes to complete the written survey that you may receive in the mail after you visit with us. Thank you!        [employer] Information     [employer] lets you send messages to your doctor, view your test results, renew your prescriptions, schedule appointments and more. To sign up, go to [email or web address] . Click on \"Log in\" on the left side of the screen, which will take you to the Welcome page. Then click on \"Sign up Now\" on the right side of the page.     You will be asked to enter the access code listed below, as well as some personal information. Please follow the directions to create your username and password.     Your access code is: [email or web address]  Expires: 10/26/2017  6:51 PM     Your access code will  in 90 days. If you need help or a new code, please call your [facility] clinic or [phone].        Care EveryWhere ID     This is your Care EveryWhere ID. This could be used by other organizations to access your [facility] medical " records  [id number]        Equal Access to Services     CASI HOPSON : Bhargav Maurer, uzair hay, javon del cid. So [facility] [phone].    ATENCIÓN: Si habla español, tiene a griffin disposición servicios gratuitos de asistencia lingüística. Llame al [phone].    We comply with applicable federal civil rights laws and [place] laws. We do not discriminate on the basis of race, color, national origin, age, disability sex, sexual orientation or gender identity.            After Visit Summary       This is your record. Keep this with you and show to your community pharmacist(s) and doctor(s) at your next visit.

## 2017-08-06 NOTE — DISCHARGE INSTRUCTIONS
Lower Extremity Contusion  You have a contusion (bruise) of a lower extremity (leg, knee, ankle, foot, or toe). Symptoms include pain, swelling, and skin discoloration. No bones are broken. This injury may take from a few days to a few weeks to heal.  During that time, the bruise may change from reddish in color, to purple-blue, to green-yellow, to yellow-brown.  Home care    Unless another medicine was prescribed, you can take acetaminophen, ibuprofen, or naproxen to control pain. (If you have chronic liver or kidney disease or ever had a stomach ulcer or gastrointestinal bleeding, talk with your doctor before using these medicines.)    Elevate the injured area to reduce pain and swelling. As much as possible, sit or lie down with the injured area raised about the level of your heart. This is especially important during the first 48 hours.    Ice the injured area to help reduce pain and swelling. Wrap a cold source (ice pack or ice cubes in a plastic bag) in a thin towel. Apply to the bruised area for 20 minutes every 1 to 2 hours the first day. Continue this 3 to 4 times a day until the pain and swelling goes away.    If crutches have been advised, do not bear full weight on the injured leg until you can do so without pain. You may return to sports when you are able to put full weight and impact on the injured leg without pain.  Follow up  Follow up with your healthcare provider or our staff as advised. Call if you are not improving within the next 1 to 2 weeks.  When to seek medical advice   Call your healthcare provider right away if any of these occur:    Increased pain or swelling    Foot or toes become cold, blue, numb or tingly    Signs of infection: Warmth, drainage, or increased redness or pain around the injury    Inability to move the injured area     Frequent bruising for unknown reasons  Date Last Reviewed: 2/1/2017 2000-2017 [employer]. [address] [place] [address], [place] [address].  All rights reserved. This information is not intended as a substitute for professional medical care. Always follow your healthcare professional's instructions.

## 2017-08-06 NOTE — ED NOTES
Pt rolled knee on 7-31. Pt with c/o pain and discomfort after and knee became very swollen, which then resolved and became a bruise. Pt states that each day since then the knee and lower leg had increased swelling and bruising. Pt presenting today with ecchymosis and slight warmth to knee and calf/shin down to ankle.Right side

## 2017-08-06 NOTE — ED AVS SNAPSHOT
[facility] Emergency Department    [address] [facility] [address]    Phone:  [phone]    Fax:  [phone]                                       Pancho Ray   MRN: [id number]    Department:  [facility] Emergency Department   Date of Visit:  8/6/2017           After Visit Summary Signature Page     I have received my discharge instructions, and my questions have been answered. I have discussed any challenges I see with this plan with the nurse or doctor.    ..........................................................................................................................................  Patient/Patient Representative Signature      ..........................................................................................................................................  Patient Representative Print Name and Relationship to Patient    ..................................................               ................................................  Date                                            Time    ..........................................................................................................................................  Reviewed by Signature/Title    ...................................................              ..............................................  Date                                                            Time

## 2017-09-18 ENCOUNTER — OFFICE VISIT (OUTPATIENT)
Dept: OCCUPATIONAL MEDICINE | Facility: CLINIC | Age: 32
End: 2017-09-18

## 2017-09-18 VITALS — BODY MASS INDEX: 32.3 KG/M2 | HEIGHT: 70 IN | WEIGHT: 225.6 LBS

## 2017-09-18 DIAGNOSIS — Z13.83 SCREENING FOR RESPIRATORY CONDITION: Primary | ICD-10-CM

## 2017-09-18 DIAGNOSIS — Z53.9 ERRONEOUS ENCOUNTER--DISREGARD: ICD-10-CM

## 2017-09-18 PROCEDURE — 99499 UNLISTED E&M SERVICE: CPT | Performed by: PHYSICIAN ASSISTANT

## 2017-09-18 PROCEDURE — 94010 BREATHING CAPACITY TEST: CPT | Performed by: PHYSICIAN ASSISTANT

## 2017-09-18 NOTE — MR AVS SNAPSHOT
"              After Visit Summary   9/18/2017    Pancho Ray    MRN: [id number]           Patient Information     Date Of Birth          1985        Visit Information        Provider Department      9/18/2017 1:00 PM [facility] NURSE [facility]        Today's Diagnoses     Screening for respiratory condition    -  1    ERRONEOUS ENCOUNTER--DISREGARD           Follow-ups after your visit        Your next 10 appointments already scheduled     Sep 18, 2017  1:40 PM CDT   SHORT with Jordon Wilson PA-C   [facility]    [address] [place] [id number] [phone]              Who to contact     If you have questions or need follow up information about today's clinic visit or your schedule please contact [facility] directly at [phone].  Normal or non-critical lab and imaging results will be communicated to you by MyChart, letter or phone within 4 business days after the clinic has received the results. If you do not hear from us within 7 days, please contact the clinic through MyChart or phone. If you have a critical or abnormal lab result, we will notify you by phone as soon as possible.  Submit refill requests through [employer] or call your pharmacy and they will forward the refill request to us. Please allow 3 business days for your refill to be completed.          Additional Information About Your Visit        MyChart Information     [employer] lets you send messages to your doctor, view your test results, renew your prescriptions, schedule appointments and more. To sign up, go to [email or web address] . Click on \"Log in\" on the left side of the screen, which will take you to the Welcome page. Then click on \"Sign up Now\" on the right side of the page.     You will be asked to enter the access code listed below, as well as some personal information. Please follow the directions to create your username " "and password.     Your access code is: [email or web address]  Expires: 10/26/2017  6:51 PM     Your access code will  in 90 days. If you need help or a new code, please call your [facility] clinic or [phone].        Care EveryWhere ID     This is your Care EveryWhere ID. This could be used by other organizations to access your [facility] medical records  [id number]        Your Vitals Were     Height BMI (Body Mass Index)                1.778 m (5' 10\") 32.37 kg/m2           Blood Pressure from Last 3 Encounters:   17 113/80   17 125/83   17 113/78    Weight from Last 3 Encounters:   17 102.3 kg (225 lb 9.6 oz)   17 97.5 kg (215 lb)   17 95.3 kg (210 lb)              Today, you had the following     No orders found for display       Primary Care Provider    Unknown Primary MD Silver       No address on file        Equal Access to Services     [facility]: Hadii aad ku hadasho Soomaali, waaxda luqadaha, qaybta kaalmada adeegyada, waxay idiin haykrunaln aurora st[place]ford . So [facility] [phone].    ATENCIÓN: Si habla español, tiene a griffin disposición servicios gratuitos de asistencia lingüística. Llame al [phone].    We comply with applicable federal civil rights laws and [place] laws. We do not discriminate on the basis of race, color, national origin, age, disability sex, sexual orientation or gender identity.            Thank you!     Thank you for choosing [facility]  for your care. Our goal is always to provide you with excellent care. Hearing back from our patients is one way we can continue to improve our services. Please take a few minutes to complete the written survey that you may receive in the mail after your visit with us. Thank you!             Your Updated Medication List - Protect others around you: Learn how to safely use, store and throw away your medicines at www.disposemymeds.org.      Notice  As of 2017  1:29 PM    You have not been " prescribed any medications.

## 2019-10-07 ENCOUNTER — APPOINTMENT (OUTPATIENT)
Dept: OCCUPATIONAL MEDICINE | Facility: CLINIC | Age: 34
End: 2019-10-07

## 2019-10-07 PROCEDURE — 99000 SPECIMEN HANDLING OFFICE-LAB: CPT | Performed by: FAMILY MEDICINE

## 2025-05-01 ENCOUNTER — HOSPITAL ENCOUNTER (EMERGENCY)
Facility: CLINIC | Age: 40
Discharge: HOME | End: 2025-05-01
Attending: PHYSICIAN ASSISTANT

## 2025-05-01 ENCOUNTER — APPOINTMENT (OUTPATIENT)
Dept: CT IMAGING | Facility: CLINIC | Age: 40
End: 2025-05-01
Attending: PHYSICIAN ASSISTANT

## 2025-05-01 ENCOUNTER — APPOINTMENT (OUTPATIENT)
Dept: GENERAL RADIOLOGY | Facility: CLINIC | Age: 40
End: 2025-05-01
Attending: PHYSICIAN ASSISTANT

## 2025-05-01 ENCOUNTER — OFFICE VISIT (OUTPATIENT)
Dept: URGENT CARE | Facility: URGENT CARE | Age: 40
End: 2025-05-01
Payer: COMMERCIAL

## 2025-05-01 VITALS
HEART RATE: 89 BPM | RESPIRATION RATE: 18 BRPM | SYSTOLIC BLOOD PRESSURE: 145 MMHG | DIASTOLIC BLOOD PRESSURE: 104 MMHG | OXYGEN SATURATION: 96 % | TEMPERATURE: 97.9 F

## 2025-05-01 VITALS
TEMPERATURE: 98.4 F | RESPIRATION RATE: 15 BRPM | HEART RATE: 85 BPM | WEIGHT: 241 LBS | OXYGEN SATURATION: 97 % | BODY MASS INDEX: 34.58 KG/M2 | SYSTOLIC BLOOD PRESSURE: 130 MMHG | DIASTOLIC BLOOD PRESSURE: 90 MMHG
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
## 2025-05-01 DIAGNOSIS — M54.2 NECK PAIN: ICD-10-CM

## 2025-05-01 DIAGNOSIS — R51.9 HEADACHE: ICD-10-CM
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
## 2025-05-01 DIAGNOSIS — M54.50 LOW BACK PAIN: ICD-10-CM

## 2025-05-01 PROCEDURE — 70450 CT HEAD/BRAIN W/O DYE: CPT

## 2025-05-01 PROCEDURE — 72125 CT NECK SPINE W/O DYE: CPT

## 2025-05-01 PROCEDURE — 99284 EMERGENCY DEPT VISIT MOD MDM: CPT | Performed by: PHYSICIAN ASSISTANT

## 2025-05-01 PROCEDURE — 99284 EMERGENCY DEPT VISIT MOD MDM: CPT | Mod: 25 | Performed by: PHYSICIAN ASSISTANT

## 2025-05-01 PROCEDURE — 72100 X-RAY EXAM L-S SPINE 2/3 VWS: CPT

## 2025-05-01 RX ORDER — BUPROPION HYDROCHLORIDE 300 MG/1
300 TABLET ORAL EVERY MORNING
COMMUNITY
Start: 2024-07-31

## 2025-05-01 ASSESSMENT — ENCOUNTER SYMPTOMS
WHEEZING: 0
CONSTITUTIONAL NEGATIVE: 1
NECK PAIN: 1
BACK PAIN: 1
BACK PAIN: 1
NECK PAIN: 1
HEMATURIA: 0
CARDIOVASCULAR NEGATIVE: 1
HEADACHES: 1
SORE THROAT: 0
HEADACHES: 1
MYALGIAS: 1
VOMITING: 0
FEVER: 0
DYSURIA: 0
ABDOMINAL PAIN: 0
DIZZINESS: 1
FREQUENCY: 0
DIARRHEA: 0
NAUSEA: 0
ALLERGIC/IMMUNOLOGIC NEGATIVE: 1
PALPITATIONS: 0
SHORTNESS OF BREATH: 0
CONSTITUTIONAL NEGATIVE: 1
CHILLS: 0
COUGH: 0
CHEST TIGHTNESS: 0
RESPIRATORY NEGATIVE: 1
ARTHRALGIAS: 1
GASTROINTESTINAL NEGATIVE: 1

## 2025-05-01 ASSESSMENT — ACTIVITIES OF DAILY LIVING (ADL)
ADLS_ACUITY_SCORE: 41
ADLS_ACUITY_SCORE: 41

## 2025-05-01 ASSESSMENT — COLUMBIA-SUICIDE SEVERITY RATING SCALE - C-SSRS
6. HAVE YOU EVER DONE ANYTHING, STARTED TO DO ANYTHING, OR PREPARED TO DO ANYTHING TO END YOUR LIFE?: NO
1. IN THE PAST MONTH, HAVE YOU WISHED YOU WERE DEAD OR WISHED YOU COULD GO TO SLEEP AND NOT WAKE UP?: NO
2. HAVE YOU ACTUALLY HAD ANY THOUGHTS OF KILLING YOURSELF IN THE PAST MONTH?: NO
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
## 2025-05-06 ENCOUNTER — APPOINTMENT (OUTPATIENT)
Dept: GENERAL RADIOLOGY | Facility: CLINIC | Age: 40
End: 2025-05-06
Attending: EMERGENCY MEDICINE
Payer: COMMERCIAL

## 2025-05-06 ENCOUNTER — HOSPITAL ENCOUNTER (EMERGENCY)
Facility: CLINIC | Age: 40
Discharge: HOME | End: 2025-05-06
Attending: EMERGENCY MEDICINE | Admitting: EMERGENCY MEDICINE

## 2025-05-06 VITALS
BODY MASS INDEX: 35.07 KG/M2 | TEMPERATURE: 97.9 F | SYSTOLIC BLOOD PRESSURE: 136 MMHG | HEART RATE: 98 BPM | RESPIRATION RATE: 18 BRPM | OXYGEN SATURATION: 96 % | HEIGHT: 70 IN | DIASTOLIC BLOOD PRESSURE: 86 MMHG | WEIGHT: 245 LBS
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
## 2025-05-06 DIAGNOSIS — M25.562 ACUTE PAIN OF BOTH KNEES: ICD-10-CM

## 2025-05-06 DIAGNOSIS — M25.561 ACUTE PAIN OF BOTH KNEES: ICD-10-CM

## 2025-05-06 PROCEDURE — 73560 X-RAY EXAM OF KNEE 1 OR 2: CPT | Mod: 50

## 2025-05-06 PROCEDURE — 99284 EMERGENCY DEPT VISIT MOD MDM: CPT | Performed by: EMERGENCY MEDICINE

## 2025-05-06 PROCEDURE — 99283 EMERGENCY DEPT VISIT LOW MDM: CPT | Performed by: EMERGENCY MEDICINE

## 2025-05-06 ASSESSMENT — COLUMBIA-SUICIDE SEVERITY RATING SCALE - C-SSRS
1. IN THE PAST MONTH, HAVE YOU WISHED YOU WERE DEAD OR WISHED YOU COULD GO TO SLEEP AND NOT WAKE UP?: NO
2. HAVE YOU ACTUALLY HAD ANY THOUGHTS OF KILLING YOURSELF IN THE PAST MONTH?: NO
6. HAVE YOU EVER DONE ANYTHING, STARTED TO DO ANYTHING, OR PREPARED TO DO ANYTHING TO END YOUR LIFE?: NO

## 2025-05-06 ASSESSMENT — ACTIVITIES OF DAILY LIVING (ADL): ADLS_ACUITY_SCORE: 41
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
## 2025-05-06 NOTE — DISCHARGE INSTRUCTIONS
Continue using acetaminophen and ibuprofen as needed for the pain.  Apply ice for 10 to 15 minutes at a time every couple of hours as awake, return if worse, otherwise follow-up in clinic.

## 2025-05-17 ENCOUNTER — HEALTH MAINTENANCE LETTER (OUTPATIENT)
Age: 40
End: 2025-05-17